# Patient Record
Sex: MALE | Race: WHITE | Employment: OTHER | URBAN - METROPOLITAN AREA
[De-identification: names, ages, dates, MRNs, and addresses within clinical notes are randomized per-mention and may not be internally consistent; named-entity substitution may affect disease eponyms.]

---

## 2021-05-29 ENCOUNTER — APPOINTMENT (OUTPATIENT)
Dept: GENERAL RADIOLOGY | Age: 61
End: 2021-05-29
Attending: STUDENT IN AN ORGANIZED HEALTH CARE EDUCATION/TRAINING PROGRAM
Payer: COMMERCIAL

## 2021-05-29 ENCOUNTER — APPOINTMENT (OUTPATIENT)
Dept: ULTRASOUND IMAGING | Age: 61
End: 2021-05-29
Attending: STUDENT IN AN ORGANIZED HEALTH CARE EDUCATION/TRAINING PROGRAM
Payer: COMMERCIAL

## 2021-05-29 ENCOUNTER — HOSPITAL ENCOUNTER (EMERGENCY)
Age: 61
Discharge: HOME | End: 2021-05-29
Attending: STUDENT IN AN ORGANIZED HEALTH CARE EDUCATION/TRAINING PROGRAM
Payer: COMMERCIAL

## 2021-05-29 VITALS
OXYGEN SATURATION: 96 % | TEMPERATURE: 98 F | WEIGHT: 225.31 LBS | DIASTOLIC BLOOD PRESSURE: 90 MMHG | HEART RATE: 75 BPM | RESPIRATION RATE: 16 BRPM | SYSTOLIC BLOOD PRESSURE: 160 MMHG

## 2021-05-29 DIAGNOSIS — M79.89 LEG SWELLING: Primary | ICD-10-CM

## 2021-05-29 PROCEDURE — 99282 EMERGENCY DEPT VISIT SF MDM: CPT

## 2021-05-29 PROCEDURE — 93971 EXTREMITY STUDY: CPT

## 2021-05-29 PROCEDURE — 73562 X-RAY EXAM OF KNEE 3: CPT

## 2021-05-29 RX ORDER — NAPROXEN 500 MG/1
500 TABLET ORAL
Qty: 20 TABLET | Refills: 0 | Status: SHIPPED | OUTPATIENT
Start: 2021-05-29

## 2021-05-30 NOTE — ED PROVIDER NOTES
EMERGENCY DEPARTMENT HISTORY AND PHYSICAL EXAM      Date: 5/29/2021  Patient Name: Raul Farr    History of Presenting Illness     Chief Complaint   Patient presents with    Leg Swelling     injured right leg on trailer ball last week, now with bruising and swelling to knee and swelling to right lower leg. HPI: Raul Farr, 62 y.o. male presents to the ED with cc of right leg swelling. 2 weeks ago he hit it against a trailer bar, then he has been working on a roof and kneeling along the side of his leg for extended periods of time. Over the past 2 days he has noted bruising along his right leg and some increasing swelling around the knee area now extending down to the ankle. He denies any chest pain or shortness of breath, no fevers, he denies any pain. There are no other complaints, changes, or physical findings at this time. PCP: Nataliya Landry MD    No current facility-administered medications on file prior to encounter. No current outpatient medications on file prior to encounter. Past History     Past Medical History:  No past medical history on file. Past Surgical History:  No past surgical history on file. Family History:  No family history on file. Social History:  Social History     Tobacco Use    Smoking status: Never Smoker   Substance Use Topics    Alcohol use: Not Currently    Drug use: Not on file       Allergies:  No Known Allergies      Review of Systems   no fever  No eye pain  No ear pain  no shortness of breath  no chest pain  no abdominal pain  no dysuria  Reports right leg swelling  Reports bruising to right leg  No lymphadenopathy  No weight loss    Physical Exam   Physical Exam  Constitutional:       General: He is not in acute distress. HENT:      Head: Normocephalic. Eyes:      Extraocular Movements: Extraocular movements intact. Cardiovascular:      Rate and Rhythm: Normal rate and regular rhythm.    Pulmonary:      Effort: Pulmonary effort is normal.   Abdominal:      Palpations: Abdomen is soft. Tenderness: There is no abdominal tenderness. Musculoskeletal:      Comments: Diffuse swelling of the right leg, with some pitting edema of the ankle, and swelling around the knee and calf, there is purplish scattered bruising along the medial aspect of the right knee. There is no bony tenderness, there is full range of motion at all joints, no erythema or warmth, DP pulses strong, sensation to light touch intact diffusely   Skin:     General: Skin is warm and dry. Neurological:      General: No focal deficit present. Mental Status: He is alert and oriented to person, place, and time. Psychiatric:         Mood and Affect: Mood normal.         Diagnostic Study Results     Labs -   No results found for this or any previous visit (from the past 24 hour(s)). Radiologic Studies -   XR KNEE RT 3 V   Final Result   Soft tissue swelling anterior to the patella. No acute findings   otherwise. CT Results  (Last 48 hours)    None        CXR Results  (Last 48 hours)    None            Medical Decision Making   I am the first provider for this patient. I reviewed the vital signs, available nursing notes, past medical history, past surgical history, family history and social history. Vital Signs-Reviewed the patient's vital signs. Patient Vitals for the past 24 hrs:   Temp Pulse Resp BP SpO2   05/29/21 1951 98 °F (36.7 °C) 75 16 (!) 160/90 96 %         Provider Notes (Medical Decision Making):   71-year-old male presenting with right leg swelling. Symptoms are concerning for likely soft tissue injury, possible muscular pain, x-ray to assess for any osseous injury, he is neurovascularly intact without any signs of infection. Given the diffuse leg swelling, duplex will be obtained to assess for DVT. [facility] [place] ED Course:     Initial assessment performed.  The patients presenting problems have been discussed, and they are in agreement with the care plan formulated and outlined with them. I have encouraged them to ask questions as they arise throughout their visit. X-ray shows soft tissue swelling anterior to the patella, no acute findings otherwise, duplex is negative for DVT. Patient is counseled on supportive care and return precautions. Will return to the ED for any worsening pain, fevers, redness. Will followup with primary care doctor as needed within 7-10 days. Critical Care Time:         Disposition:  Home    PLAN:  1. Discharge Medication List as of 5/29/2021  9:07 PM        2.    Follow-up Information     Follow up With Specialties Details Why Contact Info    Your primary care doctor        [facility] EMERGENCY DEPT Emergency Medicine  As needed, If symptoms worsen [address] [place] [address]  [phone]        Return to ED if worse     Diagnosis     Clinical Impression: Acute right leg soft tissue injury

## 2022-03-07 ENCOUNTER — OFFICE VISIT (OUTPATIENT)
Dept: ORTHOPEDIC SURGERY | Age: 62
End: 2022-03-07
Payer: COMMERCIAL

## 2022-03-07 VITALS — HEIGHT: 75 IN | WEIGHT: 230 LBS | BODY MASS INDEX: 28.6 KG/M2

## 2022-03-07 DIAGNOSIS — M17.12 PRIMARY OSTEOARTHRITIS OF LEFT KNEE: Primary | ICD-10-CM

## 2022-03-07 DIAGNOSIS — M25.562 CHRONIC PAIN OF LEFT KNEE: ICD-10-CM

## 2022-03-07 DIAGNOSIS — G89.29 CHRONIC PAIN OF LEFT KNEE: ICD-10-CM

## 2022-03-07 PROCEDURE — 99204 OFFICE O/P NEW MOD 45 MIN: CPT | Performed by: PHYSICIAN ASSISTANT

## 2022-03-07 RX ORDER — DICLOFENAC SODIUM 75 MG/1
75 TABLET, DELAYED RELEASE ORAL 2 TIMES DAILY
Qty: 60 TABLET | Refills: 1 | Status: SHIPPED | OUTPATIENT
Start: 2022-03-07

## 2022-03-07 RX ORDER — PITAVASTATIN CALCIUM 4.18 MG/1
4 TABLET, FILM COATED ORAL DAILY
COMMUNITY
Start: 2022-02-21

## 2022-03-07 RX ORDER — AMLODIPINE BESYLATE 5 MG/1
5 TABLET ORAL DAILY
COMMUNITY
Start: 2021-06-22

## 2022-03-07 NOTE — LETTER
3/9/2022    Patient: Jamie Sethi   YOB: 1960   Date of Visit: 3/7/2022     Elise Chacon MD  [address] [facility] [address]  Via Fax: [phone]    Dear Elise Chacon MD,      Thank you for referring Mr. Medina Barriga to [facility] for evaluation. My notes for this consultation are attached. If you have questions, please do not hesitate to call me. I look forward to following your patient along with you.       Sincerely,    Alfonso William PA-C

## 2022-03-07 NOTE — PROGRESS NOTES
Rg Perez (: 1960) is a 65 y.o. male, patient, here for evaluation of the following chief complaint(s):  Knee Pain (left knee)       SUBJECTIVE/OBJECTIVE:  Rg Perez presents today complaining of left knee pain. Issues began 4 to 5 months ago. Denies known injury or trauma. Swelling is associated. He took a recent Medrol Dosepak which helped some. No awakening night pain. Left leg feels weak because of the swelling. Denies instability or falls. PHYSICAL EXAM:  Vitals: Ht 6' 3\" (1.905 m)   Wt 230 lb (104.3 kg)   BMI 28.75 kg/m²   Body mass index is 28.75 kg/m². 65y.o. year old M in no acute distress. Ambulates with a normal gait pattern. Negative Stinchfield bilaterally. Well-preserved passive range of motion of the hip without pain. Negative straight leg raise, no nerve tension signs. Neutral alignment of the left knee. No instability. No tenderness to palpation. Trace to 1+ effusion. Range of motion 0-120 degrees. Motor 5/5. No distal edema. 2+ dorsalis pedal pulse left lower extremity    IMAGING:  Radiographs: XR Results (most recent):  Results from Appointment encounter on 22    XR KNEE LT MIN 4 V    Narrative  Four views (AP, PA-flex, lateral & sunrise) of the left knee were taken and reviewed today using digital radiography which reveal complete loss medial joint space. Mild PF OA. Incidental finding enchondroma left distal femur. Incidental finding narrowing right knee medial joint space. Discussed x-ray results with [facility] radiologist, Dr. Cale Cortez. Agree with typical appearance of enchondroma. ASSESSMENT/PLAN:  1. Primary osteoarthritis of left knee  -     REFERRAL TO PHYSICAL THERAPY  -     diclofenac EC (VOLTAREN) 75 mg EC tablet; Take 1 Tablet by mouth two (2) times a day., Normal, Disp-60 Tablet, R-1  2. Chronic pain of left knee  -     XR KNEE LT MIN 4 V; Future    The xray and exam findings were discussed with the patient today.   Left knee osteoarthritis with associated effusion. Incidental finding of enchomdroma on xray. Discussed risks/benefits of conservative vs. operative interventions at this time to include NSAIDs, PT, cortisone injections, and surgery. He elects to start conservatively, and will begin diclofenac once his Medrol Dosepak is complete. In addition, I have prescribed formal physical therapy. He is very active at work. Discussed natural disease progression and possible need for surgery in the future. Next step for acute flareup would be to consider cortisone injection. Follow-up as symptoms dictate. No follow-ups on file. Review Of Systems  ROS     Positive for: Musculoskeletal    Last edited by Nahomi Herman RN on 3/7/2022  3:41 PM. (History)         Patient denies any recent fever, chills, nausea, vomiting, chest pain, or shortness of breath. No Known Allergies    Current Outpatient Medications   Medication Sig    amLODIPine (NORVASC) 5 mg tablet Take 5 mg by mouth daily.  diclofenac EC (VOLTAREN) 75 mg EC tablet Take 1 Tablet by mouth two (2) times a day.  Livalo 4 mg tab tablet Take 4 mg by mouth daily.  naproxen (NAPROSYN) 500 mg tablet Take 1 Tablet by mouth every twelve (12) hours as needed for Pain. No current facility-administered medications for this visit. History reviewed. No pertinent past medical history. History reviewed. No pertinent surgical history. History reviewed. No pertinent family history.      Social History     Socioeconomic History    Marital status:      Spouse name: Not on file    Number of children: Not on file    Years of education: Not on file    Highest education level: Not on file   Occupational History    Not on file   Tobacco Use    Smoking status: Never Smoker    Smokeless tobacco: Never Used   Substance and Sexual Activity    Alcohol use: Not Currently    Drug use: Not on file    Sexual activity: Not on file   Other Topics Concern    Not on file   Social History Narrative    Not on file     Social Determinants of Health     Financial Resource Strain:     Difficulty of Paying Living Expenses: Not on file   Food Insecurity:     Worried About Running Out of Food in the Last Year: Not on file    Irene of Food in the Last Year: Not on file   Transportation Needs:     Lack of Transportation (Medical): Not on file    Lack of Transportation (Non-Medical): Not on file   Physical Activity:     Days of Exercise per Week: Not on file    Minutes of Exercise per Session: Not on file   Stress:     Feeling of Stress : Not on file   Social Connections:     Frequency of Communication with Friends and Family: Not on file    Frequency of Social Gatherings with Friends and Family: Not on file    Attends Gnosticist Services: Not on file    Active Member of [address] [place] [address] or Organizations: Not on file    Attends Club or Organization Meetings: Not on file    Marital Status: Not on file   Intimate Partner Violence:     Fear of Current or Ex-Partner: Not on file    Emotionally Abused: Not on file    Physically Abused: Not on file    Sexually Abused: Not on file   Housing Stability:     Unable to Pay for Housing in the Last Year: Not on file    Number of [place] in the Last Year: Not on file    Unstable Housing in the Last Year: Not on file       Orders Placed This Encounter    XR KNEE LT MIN 4 V     Standing Status:   Future     Number of Occurrences:   1     Standing Expiration Date:   3/8/2023    REFERRAL TO PHYSICAL THERAPY     Referral Priority:   Routine     Referral Type:   PT/OT/ST     Referral Reason:   Specialty Services Required     Number of Visits Requested:   1    diclofenac EC (VOLTAREN) 75 mg EC tablet     Sig: Take 1 Tablet by mouth two (2) times a day. Dispense:  60 Tablet     Refill:  1      Rey Chaudhari M.D. was available for immediate consultation as the supervising physician.   An electronic signature was used to authenticate this note.   -- Alfonso William PA-C